# Patient Record
Sex: FEMALE | Race: WHITE | Employment: OTHER | URBAN - METROPOLITAN AREA
[De-identification: names, ages, dates, MRNs, and addresses within clinical notes are randomized per-mention and may not be internally consistent; named-entity substitution may affect disease eponyms.]

---

## 2018-08-17 ENCOUNTER — ESTABLISHED COMPREHENSIVE EXAM (OUTPATIENT)
Dept: URBAN - METROPOLITAN AREA CLINIC 43 | Facility: CLINIC | Age: 73
End: 2018-08-17

## 2018-08-17 DIAGNOSIS — Z96.1: ICD-10-CM

## 2018-08-17 DIAGNOSIS — H43.813: ICD-10-CM

## 2018-08-17 PROCEDURE — 92015 DETERMINE REFRACTIVE STATE: CPT

## 2018-08-17 PROCEDURE — 92014 COMPRE OPH EXAM EST PT 1/>: CPT

## 2018-08-17 ASSESSMENT — VISUAL ACUITY
OS_SC: J12
OS_SC: 20/200
OS_CC: J1
OD_SC: J12
OD_CC: J1
OD_SC: 20/200
OS_CC: 20/30
OD_CC: 20/30

## 2018-08-17 ASSESSMENT — TONOMETRY
OS_IOP_MMHG: 18
OD_IOP_MMHG: 19

## 2019-08-16 ENCOUNTER — ESTABLISHED COMPREHENSIVE EXAM (OUTPATIENT)
Dept: URBAN - METROPOLITAN AREA CLINIC 43 | Facility: CLINIC | Age: 74
End: 2019-08-16

## 2019-08-16 DIAGNOSIS — Z96.1: ICD-10-CM

## 2019-08-16 DIAGNOSIS — H35.373: ICD-10-CM

## 2019-08-16 DIAGNOSIS — H43.813: ICD-10-CM

## 2019-08-16 PROCEDURE — 92015 DETERMINE REFRACTIVE STATE: CPT

## 2019-08-16 PROCEDURE — 92014 COMPRE OPH EXAM EST PT 1/>: CPT

## 2019-08-16 ASSESSMENT — VISUAL ACUITY
OS_SC: 20/200
OD_SC: 20/70-1
OS_CC: 20/30-1
OD_CC: 20/30
OS_SC: J4
OS_CC: J1
OD_SC: J6
OD_CC: J3

## 2019-08-16 ASSESSMENT — TONOMETRY
OD_IOP_MMHG: 13
OS_IOP_MMHG: 15.5

## 2020-08-17 ENCOUNTER — ESTABLISHED COMPREHENSIVE EXAM (OUTPATIENT)
Dept: URBAN - METROPOLITAN AREA CLINIC 43 | Facility: CLINIC | Age: 75
End: 2020-08-17

## 2020-08-17 DIAGNOSIS — H35.373: ICD-10-CM

## 2020-08-17 DIAGNOSIS — H43.813: ICD-10-CM

## 2020-08-17 DIAGNOSIS — Z96.1: ICD-10-CM

## 2020-08-17 PROCEDURE — 92015 DETERMINE REFRACTIVE STATE: CPT

## 2020-08-17 PROCEDURE — 92014 COMPRE OPH EXAM EST PT 1/>: CPT

## 2020-08-17 ASSESSMENT — TONOMETRY
OD_IOP_MMHG: 16
OS_IOP_MMHG: 16

## 2020-08-17 ASSESSMENT — VISUAL ACUITY
OD_CC: 20/40+2
OD_SC: J8
OD_SC: 20/70
OS_SC: 20/400
OD_CC: J1
OS_CC: J2
OS_CC: 20/40
OS_SC: J8

## 2020-10-27 NOTE — PATIENT DISCUSSION
New Prescription: prednisolone acetate (prednisolone acetate): drops,suspension: 1% 1 drop every two hours while awake as directed into both eyes 10-

## 2020-11-05 NOTE — PATIENT DISCUSSION
Continue: prednisolone acetate (prednisolone acetate): drops,suspension: 1% 1 drop every two hours while awake as directed into both eyes 10-

## 2021-09-24 ENCOUNTER — ESTABLISHED COMPREHENSIVE EXAM (OUTPATIENT)
Dept: URBAN - METROPOLITAN AREA CLINIC 43 | Facility: CLINIC | Age: 76
End: 2021-09-24

## 2021-09-24 DIAGNOSIS — H43.813: ICD-10-CM

## 2021-09-24 DIAGNOSIS — H35.373: ICD-10-CM

## 2021-09-24 DIAGNOSIS — Z96.1: ICD-10-CM

## 2021-09-24 PROCEDURE — 92014 COMPRE OPH EXAM EST PT 1/>: CPT

## 2021-09-24 PROCEDURE — 92015 DETERMINE REFRACTIVE STATE: CPT

## 2021-09-24 ASSESSMENT — VISUAL ACUITY
OD_CC: J1
OS_SC: J8
OD_CC: 20/60
OD_SC: J6
OS_CC: 20/40-2
OD_SC: 20/100-2
OS_CC: J3
OS_SC: 20/400

## 2021-09-24 ASSESSMENT — TONOMETRY
OD_IOP_MMHG: 15
OS_IOP_MMHG: 16

## 2022-11-16 ENCOUNTER — COMPREHENSIVE EXAM (OUTPATIENT)
Dept: URBAN - METROPOLITAN AREA CLINIC 43 | Facility: CLINIC | Age: 77
End: 2022-11-16

## 2022-11-16 DIAGNOSIS — H35.373: ICD-10-CM

## 2022-11-16 DIAGNOSIS — H43.813: ICD-10-CM

## 2022-11-16 DIAGNOSIS — Z96.1: ICD-10-CM

## 2022-11-16 PROCEDURE — 92014 COMPRE OPH EXAM EST PT 1/>: CPT

## 2022-11-16 PROCEDURE — 92015 DETERMINE REFRACTIVE STATE: CPT

## 2022-11-16 ASSESSMENT — VISUAL ACUITY
OS_SC: 20/200
OS_SC: J6-
OD_CC: J2-
OS_CC: J2
OS_CC: 20/30-2
OD_SC: 20/70-1
OD_CC: 20/30-1
OD_SC: J6

## 2022-11-16 ASSESSMENT — TONOMETRY
OD_IOP_MMHG: 15
OS_IOP_MMHG: 15

## 2022-11-22 NOTE — PATIENT DISCUSSION
Patient doesn't like PALs.   Uses DVO and likes it.  Going to use otc readers for computer but put together a SV option if that doesn't suit her.  Discussed the asymmetry between the two eyes (natural monovision).

## 2023-09-14 NOTE — PATIENT DISCUSSION
- Updated glasses rx given today no lesions, no deformities, no traumatic injuries, no significant scars are present, chest wall non-tender, no masses present, breathing is unlabored without accessory muscle use,normal breath sounds